# Patient Record
Sex: FEMALE | Race: BLACK OR AFRICAN AMERICAN | NOT HISPANIC OR LATINO | Employment: FULL TIME | URBAN - METROPOLITAN AREA
[De-identification: names, ages, dates, MRNs, and addresses within clinical notes are randomized per-mention and may not be internally consistent; named-entity substitution may affect disease eponyms.]

---

## 2023-06-03 DIAGNOSIS — Z36.2 ENCOUNTER FOR FOLLOW-UP ULTRASOUND OF FETAL ANATOMY: Primary | ICD-10-CM

## 2023-06-09 ENCOUNTER — OFFICE VISIT (OUTPATIENT)
Dept: MATERNAL FETAL MEDICINE | Facility: CLINIC | Age: 28
End: 2023-06-09
Payer: MEDICAID

## 2023-06-09 ENCOUNTER — PROCEDURE VISIT (OUTPATIENT)
Dept: MATERNAL FETAL MEDICINE | Facility: CLINIC | Age: 28
End: 2023-06-09
Payer: MEDICAID

## 2023-06-09 VITALS
DIASTOLIC BLOOD PRESSURE: 68 MMHG | HEIGHT: 62 IN | SYSTOLIC BLOOD PRESSURE: 95 MMHG | WEIGHT: 218 LBS | HEART RATE: 69 BPM | BODY MASS INDEX: 40.12 KG/M2

## 2023-06-09 DIAGNOSIS — O99.332 TOBACCO SMOKING AFFECTING PREGNANCY IN SECOND TRIMESTER: ICD-10-CM

## 2023-06-09 DIAGNOSIS — O99.212 OBESITY COMPLICATING PREGNANCY IN SECOND TRIMESTER: ICD-10-CM

## 2023-06-09 DIAGNOSIS — Z14.8 CARRIER OF GENETIC DEFECT: ICD-10-CM

## 2023-06-09 DIAGNOSIS — Z36.2 ENCOUNTER FOR FOLLOW-UP ULTRASOUND OF FETAL ANATOMY: ICD-10-CM

## 2023-06-09 PROCEDURE — 76811 PR US, OB FETAL EVAL & EXAM, TRANSABDOM,FIRST GESTATION: ICD-10-PCS | Mod: S$GLB,,, | Performed by: OBSTETRICS & GYNECOLOGY

## 2023-06-09 PROCEDURE — 99203 OFFICE O/P NEW LOW 30 MIN: CPT | Mod: TH,25,S$GLB, | Performed by: OBSTETRICS & GYNECOLOGY

## 2023-06-09 PROCEDURE — 3008F BODY MASS INDEX DOCD: CPT | Mod: CPTII,S$GLB,, | Performed by: OBSTETRICS & GYNECOLOGY

## 2023-06-09 PROCEDURE — 3078F PR MOST RECENT DIASTOLIC BLOOD PRESSURE < 80 MM HG: ICD-10-PCS | Mod: CPTII,S$GLB,, | Performed by: OBSTETRICS & GYNECOLOGY

## 2023-06-09 PROCEDURE — 99203 PR OFFICE/OUTPT VISIT, NEW, LEVL III, 30-44 MIN: ICD-10-PCS | Mod: TH,25,S$GLB, | Performed by: OBSTETRICS & GYNECOLOGY

## 2023-06-09 PROCEDURE — 76811 OB US DETAILED SNGL FETUS: CPT | Mod: S$GLB,,, | Performed by: OBSTETRICS & GYNECOLOGY

## 2023-06-09 PROCEDURE — 1159F PR MEDICATION LIST DOCUMENTED IN MEDICAL RECORD: ICD-10-PCS | Mod: CPTII,S$GLB,, | Performed by: OBSTETRICS & GYNECOLOGY

## 2023-06-09 PROCEDURE — 3078F DIAST BP <80 MM HG: CPT | Mod: CPTII,S$GLB,, | Performed by: OBSTETRICS & GYNECOLOGY

## 2023-06-09 PROCEDURE — 3074F PR MOST RECENT SYSTOLIC BLOOD PRESSURE < 130 MM HG: ICD-10-PCS | Mod: CPTII,S$GLB,, | Performed by: OBSTETRICS & GYNECOLOGY

## 2023-06-09 PROCEDURE — 3008F PR BODY MASS INDEX (BMI) DOCUMENTED: ICD-10-PCS | Mod: CPTII,S$GLB,, | Performed by: OBSTETRICS & GYNECOLOGY

## 2023-06-09 PROCEDURE — 1159F MED LIST DOCD IN RCRD: CPT | Mod: CPTII,S$GLB,, | Performed by: OBSTETRICS & GYNECOLOGY

## 2023-06-09 PROCEDURE — 3074F SYST BP LT 130 MM HG: CPT | Mod: CPTII,S$GLB,, | Performed by: OBSTETRICS & GYNECOLOGY

## 2023-06-09 RX ORDER — ASPIRIN 81 MG/1
TABLET ORAL
COMMUNITY

## 2023-06-09 NOTE — PROGRESS NOTES
Maternal Fetal Medicine New Consult    Subjective     Patient ID: Jesus Cummins is a 28 y.o. female  at 18w2d gestation with Estimated Date of Delivery: 23  who is here for consultation by M.    Chief Complaint: MFM FOLLOW UP       Pregnancy complications include:   Patient Active Problem List   Diagnosis    Increased BMI complicating pregnancy in second trimester    patient is SMA carrier    Tobacco smoking affecting pregnancy in second trimester        HPI 28 y.o.  female  at 18w2d gestation with Estimated Date of Delivery: 23 by LMP, consistent with early US. She is sent for MFM consultation for increased BMI. She has increased BMI greater than 35 at consult visit. She has history of GDM in previous pregnancy, treated with insulin. She reports she failed her 1hr GCT and did her 3hr GTT today. She also reports her HgbA1C with primary OB was borderline elevated showing prediabetes. Per PNR, she is a carrier for SMA. She reports FOB tested negative. She currently smokes 1/2 PPD. She denies any personal or family history of aneuploidy or anomalies. She denies any exposure to high fevers, viral rashes, illicit drugs or alcohol in this pregnancy.  She denies any leaking fluid, vaginal bleeding, contractions, decreased fetal movement. Denies headaches, visual disturbances, or epigastric pain    Past Medical History:   Diagnosis Date    Diabetes mellitus     H/O GDM       Past Surgical History:   Procedure Laterality Date     SECTION      X1       Family History   Problem Relation Age of Onset    Hypertension Mother     Diabetes Mother        Social History     Socioeconomic History    Marital status:    Tobacco Use    Smoking status: Every Day     Packs/day: 0.50     Years: 5.00     Pack years: 2.50     Types: Cigarettes     Passive exposure: Current    Smokeless tobacco: Never   Substance and Sexual Activity    Alcohol use: Not Currently    Drug use: Never    Sexual activity:  Yes       Current Outpatient Medications   Medication Sig Dispense Refill    aspirin (ECOTRIN) 81 MG EC tablet Serjio Low Dose Aspirin 81 mg tablet,delayed release   Take 1 tablet every day by oral route.      prenatal vit/iron fum/folic ac (PRENATAL 1+1 ORAL) Prenatal Vitamin 27 mg iron-0.8 mg tablet   Take 1 tablet every day by oral route for 90 days.       No current facility-administered medications for this visit.       Review of patient's allergies indicates:  No Known Allergies    Medications:  Current Outpatient Medications   Medication Instructions    aspirin (ECOTRIN) 81 MG EC tablet Serjio Low Dose Aspirin 81 mg tablet,delayed release   Take 1 tablet every day by oral route.    prenatal vit/iron fum/folic ac (PRENATAL 1+1 ORAL) Prenatal Vitamin 27 mg iron-0.8 mg tablet   Take 1 tablet every day by oral route for 90 days.         Review of Systems   Constitutional:  Negative for activity change, chills, fatigue and fever.   HENT:  Negative for nasal congestion.    Eyes:  Negative for visual disturbance.   Respiratory:  Negative for cough, shortness of breath and wheezing.    Cardiovascular:  Negative for chest pain, palpitations and leg swelling.   Gastrointestinal:  Negative for abdominal pain, constipation, diarrhea, nausea, vomiting and reflux.   Endocrine: Negative for diabetes, hyperthyroidism and hypothyroidism.   Genitourinary:  Negative for bladder incontinence, frequency, hematuria, pelvic pain, urgency, vaginal bleeding, vaginal discharge and vaginal pain.   Musculoskeletal:  Negative for back pain, joint swelling and leg pain.   Integumentary:  Negative for rash.   Neurological:  Negative for seizures and headaches.   Psychiatric/Behavioral:  Negative for depression. The patient is not nervous/anxious.    All other systems reviewed and are negative.        Objective     Physical Exam  Vitals and nursing note reviewed.   Constitutional:       General: She is not in acute distress.     Appearance:  Normal appearance.      Comments: Increased BMI   HENT:      Head: Normocephalic and atraumatic.      Nose: Nose normal. No congestion or epistaxis.      Mouth/Throat:      Pharynx: Oropharynx is clear.   Eyes:      General: No scleral icterus.     Pupils: Pupils are equal, round, and reactive to light.   Cardiovascular:      Rate and Rhythm: Normal rate and regular rhythm.   Pulmonary:      Effort: No respiratory distress.      Breath sounds: Normal breath sounds. No wheezing.   Abdominal:      General: Abdomen is flat.      Palpations: Abdomen is soft.      Tenderness: There is no abdominal tenderness. There is no right CVA tenderness, left CVA tenderness or guarding.      Comments: No CVA tenderness gravid uterus.    Musculoskeletal:         General: Normal range of motion.      Cervical back: Neck supple.      Right lower leg: No edema.      Left lower leg: No edema.   Skin:     General: Skin is warm.      Findings: No bruising or rash.   Neurological:      General: No focal deficit present.      Mental Status: She is oriented to person, place, and time.      Deep Tendon Reflexes: Reflexes normal.      Comments: Normal reflexes   Psychiatric:         Mood and Affect: Mood normal.         Behavior: Behavior normal.         Thought Content: Thought content normal.         Judgment: Judgment normal.   Miracle oklibby       Assessment and Plan     ASSESSMENT/PLAN:     28 y.o.  female with IUP at 18w2d    Increased BMI complicating pregnancy in second trimester  Increased BMI over 35 with Hx GDM  I discussed the risk of miscarriage in first trimester, recurrent miscarriages, congenital anomalies, hypertension, diabetes,  labor and the higher risk of  section and the higher risk of fetal demise in-utero. There is also higher risk of for excessive fetal weight and large for gestational age (LGA) fetuses. Mothers with LGA fetuses are at higher risk of prolonged labor,  delivery, shoulder dystocia  and birth trauma. LGA neonates are increased risk of fetal hypoxia and intrauterine death, and are at risk to develop diabetes, obesity, metabolic syndrome, asthma and cancer later in life. She was advised of the importance of eating healthy and limiting weight gain to 11-20lbs during the pregnancy, as optimal in this situation. I ainsley show show mmended low calorie, low fat diet avoiding any additional excessive weight gain. Excess weight gain would be associated with gestational hypertension, gestational diabetes and adverse  outcomes, including fetal demise in utero.    Discussed recommendations for baby aspirin use in those with increased BMI and additional risk factors, to decrease risks for adverse outcomes, including preeclampsia, low birth rate and  delivery. Low-dose aspirin reduced the risk for preeclampsia by 24% in clinical trials and reduced the risk for  birth by 14% and FGR by 20%.  Due to her risk factors, including   BMI over 30,  ancestry, and low socioeconomic status, recommend prophylactic low dose aspirin to reduce the risk of preeclampsia. After discussing risks/benefits of its use, it was agreed to continue asa 81 mg daily until delivery. Also, recommend using in all future pregnancies.    It is important to do FKC from 24 weeks till delivery.       Importance of working on losing weight after the pregnancy is over, especially before a future pregnancy was discussed. Breastfeeding may be an important tool in reducing the postpartum weight retention. Fetal risks were discussed with short term risk of fetal/ obesity and long term risk of adolescent component of metabolic syndrome.    With higher risks for gestational diabetes with BMI greater than 30, she had abnormal early 1hr GCT. Reports she did 3hr GTT today. If normal, recommend repeat 3hr GTT around 26-28 weeks gestation.  If hemoglobin A1c is truly elevated, then recommend avoiding repeating a 3 hour  GTT have the patient on a diabetic diet and do intermittent monitoring of blood sugars and if elevated we will be happy to see her at any time.    Will plan to see back in 6 weeks to  complete anatomy scan. However, if diagnosed with GDM will see back sooner.      patient is SMA carrier  SMA carrier  We discussed the condition of SMA, with grave muscular atrophy with child usually passing before second birthday from neurologic deterioration, if affected. We discussed the transmission pattern of this autosomal recessive disease. I discussed with her that the only diagnostic test at this time is amniocentesis. We discussed risks/benefits of the amniocentesis, which she again declined.  I discussed with her the need for  evaluation.    With FOB being negative, discussed that child has 50% chance of being a carrier with very low risk of disease unless a new mutation, that is extremely rare, or incomplete detection of a rare mutation on FOB testing..      Tobacco smoking affecting pregnancy in second trimester  Maternal smoking  I discussed with her adverse  outcomes associated with smoking in pregnancy. The increased  risks including first trimester miscarriage, fetal growth restriction, placenta previa,  delivery (5-8%),  premature rupture of membranes, low birth weight (13-19%), and placental abruption, which could have serious sequelae such as  mortality (5-7%).   There is also increase risk of SIDS (two fold higher risk than non smoker) in the infancy period, asthma, colic, obesity and cognitive problems with baby if smoking is continued. She was urged to quit smoking.            Follow up in about 6 weeks (around 2023) for MFM follow-up, Repeat ultrasound, Room 1 or 2 (to complete anatomy, UNLESS FAILS GTT),.           Components of this note were documented using voice recognition systems; and are subject to errors not corrected at proof reading.  Please  contact the author for any clarifications.

## 2023-06-11 PROBLEM — O99.212 OBESITY COMPLICATING PREGNANCY IN SECOND TRIMESTER: Status: ACTIVE | Noted: 2023-06-11

## 2023-06-11 PROBLEM — Z14.8 CARRIER OF GENETIC DEFECT: Status: ACTIVE | Noted: 2023-06-11

## 2023-06-11 PROBLEM — O99.332 TOBACCO SMOKING AFFECTING PREGNANCY IN SECOND TRIMESTER: Status: ACTIVE | Noted: 2023-06-11

## 2023-06-11 NOTE — ASSESSMENT & PLAN NOTE
Increased BMI over 35 with Hx GDM  I discussed the risk of miscarriage in first trimester, recurrent miscarriages, congenital anomalies, hypertension, diabetes,  labor and the higher risk of  section and the higher risk of fetal demise in-utero. There is also higher risk of for excessive fetal weight and large for gestational age (LGA) fetuses. Mothers with LGA fetuses are at higher risk of prolonged labor,  delivery, shoulder dystocia and birth trauma. LGA neonates are increased risk of fetal hypoxia and intrauterine death, and are at risk to develop diabetes, obesity, metabolic syndrome, asthma and cancer later in life. She was advised of the importance of eating healthy and limiting weight gain to 11-20lbs during the pregnancy, as optimal in this situation. I ainsley show show mmended low calorie, low fat diet avoiding any additional excessive weight gain. Excess weight gain would be associated with gestational hypertension, gestational diabetes and adverse  outcomes, including fetal demise in utero.    Discussed recommendations for baby aspirin use in those with increased BMI and additional risk factors, to decrease risks for adverse outcomes, including preeclampsia, low birth rate and  delivery. Low-dose aspirin reduced the risk for preeclampsia by 24% in clinical trials and reduced the risk for  birth by 14% and FGR by 20%.  Due to her risk factors, including   BMI over 30,  ancestry, and low socioeconomic status, recommend prophylactic low dose aspirin to reduce the risk of preeclampsia. After discussing risks/benefits of its use, it was agreed to continue asa 81 mg daily until delivery. Also, recommend using in all future pregnancies.    It is important to do FKC from 24 weeks till delivery.       Importance of working on losing weight after the pregnancy is over, especially before a future pregnancy was discussed. Breastfeeding may be an important tool in  reducing the postpartum weight retention. Fetal risks were discussed with short term risk of fetal/ obesity and long term risk of adolescent component of metabolic syndrome.    With higher risks for gestational diabetes with BMI greater than 30, she had abnormal early 1hr GCT. Reports she did 3hr GTT today. If normal, recommend repeat 3hr GTT around 26-28 weeks gestation.  If hemoglobin A1c is truly elevated, then recommend avoiding repeating a 3 hour GTT have the patient on a diabetic diet and do intermittent monitoring of blood sugars and if elevated we will be happy to see her at any time.    Will plan to see back in 6 weeks to  complete anatomy scan. However, if diagnosed with GDM will see back sooner.

## 2023-06-11 NOTE — ASSESSMENT & PLAN NOTE
Maternal smoking  I discussed with her adverse  outcomes associated with smoking in pregnancy. The increased  risks including first trimester miscarriage, fetal growth restriction, placenta previa,  delivery (5-8%),  premature rupture of membranes, low birth weight (13-19%), and placental abruption, which could have serious sequelae such as  mortality (5-7%).   There is also increase risk of SIDS (two fold higher risk than non smoker) in the infancy period, asthma, colic, obesity and cognitive problems with baby if smoking is continued. She was urged to quit smoking.

## 2023-07-03 ENCOUNTER — TELEPHONE (OUTPATIENT)
Dept: MATERNAL FETAL MEDICINE | Facility: CLINIC | Age: 28
End: 2023-07-03
Payer: MEDICAID

## 2023-07-03 NOTE — TELEPHONE ENCOUNTER
----- Message from Susan Spring sent at 7/3/2023 10:12 AM CDT -----  Regarding: Work  Patient wants to know if it is okay to work and wants a call back asap at [phone].     PT NEEDS A LETTER STATING THAT SHE HAS NO RESTRICTIONS AND CAN CONTINUE NORMAL ACTIVITY PT WILL PICK IT UP IT IS URGENT CALL WHEN READY TO   [phone] CL, CMA

## 2023-07-18 DIAGNOSIS — O99.212 OBESITY COMPLICATING PREGNANCY IN SECOND TRIMESTER: Primary | ICD-10-CM

## 2023-07-19 ENCOUNTER — PATIENT MESSAGE (OUTPATIENT)
Dept: RESEARCH | Facility: HOSPITAL | Age: 28
End: 2023-07-19
Payer: MEDICAID

## 2023-07-21 ENCOUNTER — PROCEDURE VISIT (OUTPATIENT)
Dept: MATERNAL FETAL MEDICINE | Facility: CLINIC | Age: 28
End: 2023-07-21
Payer: MEDICAID

## 2023-07-21 ENCOUNTER — OFFICE VISIT (OUTPATIENT)
Dept: MATERNAL FETAL MEDICINE | Facility: CLINIC | Age: 28
End: 2023-07-21
Payer: MEDICAID

## 2023-07-21 VITALS
HEART RATE: 107 BPM | BODY MASS INDEX: 39.93 KG/M2 | HEIGHT: 62 IN | SYSTOLIC BLOOD PRESSURE: 97 MMHG | DIASTOLIC BLOOD PRESSURE: 73 MMHG | WEIGHT: 217 LBS

## 2023-07-21 DIAGNOSIS — Z14.8 CARRIER OF GENETIC DEFECT: ICD-10-CM

## 2023-07-21 DIAGNOSIS — O99.332 TOBACCO SMOKING AFFECTING PREGNANCY IN SECOND TRIMESTER: ICD-10-CM

## 2023-07-21 DIAGNOSIS — O99.212 OBESITY COMPLICATING PREGNANCY IN SECOND TRIMESTER: Primary | ICD-10-CM

## 2023-07-21 DIAGNOSIS — O99.212 OBESITY COMPLICATING PREGNANCY IN SECOND TRIMESTER: ICD-10-CM

## 2023-07-21 PROCEDURE — 3078F PR MOST RECENT DIASTOLIC BLOOD PRESSURE < 80 MM HG: ICD-10-PCS | Mod: CPTII,S$GLB,, | Performed by: OBSTETRICS & GYNECOLOGY

## 2023-07-21 PROCEDURE — 3078F DIAST BP <80 MM HG: CPT | Mod: CPTII,S$GLB,, | Performed by: OBSTETRICS & GYNECOLOGY

## 2023-07-21 PROCEDURE — 3008F PR BODY MASS INDEX (BMI) DOCUMENTED: ICD-10-PCS | Mod: CPTII,S$GLB,, | Performed by: OBSTETRICS & GYNECOLOGY

## 2023-07-21 PROCEDURE — 99213 PR OFFICE/OUTPT VISIT, EST, LEVL III, 20-29 MIN: ICD-10-PCS | Mod: TH,S$GLB,, | Performed by: OBSTETRICS & GYNECOLOGY

## 2023-07-21 PROCEDURE — 3074F SYST BP LT 130 MM HG: CPT | Mod: CPTII,S$GLB,, | Performed by: OBSTETRICS & GYNECOLOGY

## 2023-07-21 PROCEDURE — 3074F PR MOST RECENT SYSTOLIC BLOOD PRESSURE < 130 MM HG: ICD-10-PCS | Mod: CPTII,S$GLB,, | Performed by: OBSTETRICS & GYNECOLOGY

## 2023-07-21 PROCEDURE — 76816 PR  US,PREGNANT UTERUS,F/U,TRANSABD APP: ICD-10-PCS | Mod: S$GLB,,, | Performed by: OBSTETRICS & GYNECOLOGY

## 2023-07-21 PROCEDURE — 1159F PR MEDICATION LIST DOCUMENTED IN MEDICAL RECORD: ICD-10-PCS | Mod: CPTII,S$GLB,, | Performed by: OBSTETRICS & GYNECOLOGY

## 2023-07-21 PROCEDURE — 1159F MED LIST DOCD IN RCRD: CPT | Mod: CPTII,S$GLB,, | Performed by: OBSTETRICS & GYNECOLOGY

## 2023-07-21 PROCEDURE — 99213 OFFICE O/P EST LOW 20 MIN: CPT | Mod: TH,S$GLB,, | Performed by: OBSTETRICS & GYNECOLOGY

## 2023-07-21 PROCEDURE — 3008F BODY MASS INDEX DOCD: CPT | Mod: CPTII,S$GLB,, | Performed by: OBSTETRICS & GYNECOLOGY

## 2023-07-21 PROCEDURE — 76816 OB US FOLLOW-UP PER FETUS: CPT | Mod: S$GLB,,, | Performed by: OBSTETRICS & GYNECOLOGY

## 2023-07-21 NOTE — PROGRESS NOTES
Maternal Fetal Medicine follow up consult    Subjective     Patient ID: Jesus Cummins is a 28 y.o. female  at 24w2d gestation with Estimated Date of Delivery: 23  who is here for follow  up consultation by M.    Chief Complaint: MFM FOLLOW UP W/US       Pregnancy complications include:   Patient Active Problem List   Diagnosis    Increased BMI complicating pregnancy in second trimester    patient is SMA carrier    Tobacco smoking affecting pregnancy in second trimester        No changes to medical, surgical, family, social, or obstetric history.      She has increased BMI greater than 35 at consult visit. She has history of GDM in previous pregnancy, treated with insulin. She reports she failed her 1hr GCT and did her 3hr GTT that was normal earlier.. She also reports her HgbA1C with primary OB was borderline elevated showing prediabetes. Per PNR, she is a carrier for SMA. She reports FOB tested negative. She currently smokes 1/2 PPD. she was given a follow-up to complete fetal anatomy scan.         Interval history since last MFM visit: None.. She denies any leaking fluid, vaginal bleeding, contractions, decreased fetal movement. Denies headaches, visual disturbances, or epigastric pain    Medications:  Current Outpatient Medications   Medication Instructions    aspirin (ECOTRIN) 81 MG EC tablet Serjio Low Dose Aspirin 81 mg tablet,delayed release   Take 1 tablet every day by oral route.    prenatal vit/iron fum/folic ac (PRENATAL 1+1 ORAL) Prenatal Vitamin 27 mg iron-0.8 mg tablet   Take 1 tablet every day by oral route for 90 days.       Review of Systems   Constitutional:  Negative for activity change.   Eyes:  Negative for visual disturbance.   Respiratory: Negative.     Cardiovascular:  Negative for leg swelling.   Gastrointestinal:  Negative for abdominal pain, constipation, diarrhea, nausea, vomiting and reflux.   Genitourinary:  Negative for bladder incontinence, frequency, pelvic pain,  vaginal bleeding and vaginal discharge.        Good fetal movements   Musculoskeletal:  Negative for back pain.   Neurological:  Negative for headaches.   All other systems reviewed and are negative.        Objective     Vitals:    23 1436   BP: 97/73   Pulse:         Physical Exam  Vitals and nursing note reviewed.   Constitutional:       Appearance: Normal appearance.      Comments: Increased BMI   HENT:      Head: Normocephalic and atraumatic.      Nose: Nose normal. No congestion.   Cardiovascular:      Rate and Rhythm: Normal rate.   Pulmonary:      Effort: Pulmonary effort is normal.   Skin:     Findings: No rash.   Neurological:      Mental Status: She is alert and oriented to person, place, and time.   Psychiatric:         Mood and Affect: Mood normal.         Behavior: Behavior normal.         Thought Content: Thought content normal.         Judgment: Judgment normal.          Assessment and Plan     ASSESSMENT/PLAN:     28 y.o.  female with IUP at 24w2d     Increased BMI complicating pregnancy in second trimester    There is normal fetal growth with an EFW of 704 g at the 50% and the AC at the 40%. A limited repeat fetal anatomic survey shows no abnormalities of the structures that were adequately imaged.   AFV is normal.    fetal anatomy scan completed.  With Body mass index is 39.69 kg/m²., and stable weight from last visit, she was advised to continue healthy and low caloric diet.  Excess weight gain would be associated with gestational hypertension, gestational diabetes and adverse  outcomes, including fetal demise in utero.    With risk factors associated with increased BMI, continue fetal kick counts till delivery, and continue asa 81 mg daily until delivery    It is important to lose weight after the pregnancy is over, especially before a future pregnancy was discussed. Breastfeeding may be an important tool in reducing the postpartum weight retention. Fetal risks were  discussed with short term risk of fetal/ obesity and long term risk of adolescent component of metabolic syndrome.      With normal early glucose tolerance test, recommend repeating 3 hour glucose tolerance test around 28-30 weeks'.   patient is SMA carrier  SMA carrier  We discussed the condition of SMA, with grave muscular atrophy with child usually passing before second birthday from neurologic deterioration, if affected. We discussed the transmission pattern of this autosomal recessive disease. I discussed with her that the only diagnostic test at this time is amniocentesis. We discussed risks/benefits of the amniocentesis, which she again declined.  I discussed with her the need for  evaluation.     With FOB being negative, discussed that child has 50% chance of being a carrier with very low risk of disease unless a new mutation, that is extremely rare, or incomplete detection of a rare mutation on FOB testing..        Tobacco smoking affecting pregnancy in second trimester  Maternal smoking  Smoking increases the risk of adverse  outcomes associated with smoking in pregnancy. The increased  risks including first trimester miscarriage, fetal growth restriction, placenta previa,  delivery,  premature rupture of membranes, low birth weight, and placental abruption, all of which increased  morbidity and mortality.   There is also increase risk of SIDS (two fold higher risk than non smoker) in the infancy period, asthma, colic, obesity and cognitive problems with baby if smoking is continued. She was urged to quit smoking, with was advised that the less smoking the better it is for the pregnancy and the baby.    Still smoking about 10 cigarettes a day      Follow up for NO FU. We will see any time at OB's discretion.             Components of this note were documented using voice recognition systems; and are subject to errors not corrected at proof reading.   Please contact the author for any clarifications.

## 2025-03-27 NOTE — ASSESSMENT & PLAN NOTE
1415- Patient was advised of below results and recommendations per Katelyn Behring PA-C.   Patient understood results without questions.     SMA carrier  We discussed the condition of SMA, with grave muscular atrophy with child usually passing before second birthday from neurologic deterioration, if affected. We discussed the transmission pattern of this autosomal recessive disease. I discussed with her that the only diagnostic test at this time is amniocentesis. We discussed risks/benefits of the amniocentesis, which she again declined.  I discussed with her the need for  evaluation.    With FOB being negative, discussed that child has 50% chance of being a carrier with very low risk of disease unless a new mutation, that is extremely rare, or incomplete detection of a rare mutation on FOB testing..